# Patient Record
Sex: FEMALE
[De-identification: names, ages, dates, MRNs, and addresses within clinical notes are randomized per-mention and may not be internally consistent; named-entity substitution may affect disease eponyms.]

---

## 2020-02-09 ENCOUNTER — HOSPITAL ENCOUNTER (EMERGENCY)
Dept: HOSPITAL 94 - ER | Age: 55
Discharge: HOME | End: 2020-02-09
Payer: COMMERCIAL

## 2020-02-09 VITALS — SYSTOLIC BLOOD PRESSURE: 161 MMHG | DIASTOLIC BLOOD PRESSURE: 90 MMHG

## 2020-02-09 VITALS — BODY MASS INDEX: 29.51 KG/M2 | HEIGHT: 62 IN | WEIGHT: 160.34 LBS

## 2020-02-09 DIAGNOSIS — M25.531: Primary | ICD-10-CM

## 2020-02-09 DIAGNOSIS — Z79.899: ICD-10-CM

## 2020-02-09 DIAGNOSIS — M25.511: ICD-10-CM

## 2020-02-09 PROCEDURE — 29125 APPL SHORT ARM SPLINT STATIC: CPT

## 2020-02-09 PROCEDURE — 99284 EMERGENCY DEPT VISIT MOD MDM: CPT

## 2020-02-09 PROCEDURE — 73060 X-RAY EXAM OF HUMERUS: CPT

## 2022-01-08 ENCOUNTER — HOSPITAL ENCOUNTER (EMERGENCY)
Dept: HOSPITAL 94 - ER | Age: 57
Discharge: HOME | End: 2022-01-08
Payer: SELF-PAY

## 2022-01-08 VITALS — SYSTOLIC BLOOD PRESSURE: 141 MMHG | DIASTOLIC BLOOD PRESSURE: 72 MMHG

## 2022-01-08 VITALS — BODY MASS INDEX: 29.51 KG/M2 | HEIGHT: 62 IN | WEIGHT: 160.34 LBS

## 2022-01-08 DIAGNOSIS — F17.200: ICD-10-CM

## 2022-01-08 DIAGNOSIS — Z98.890: ICD-10-CM

## 2022-01-08 DIAGNOSIS — G89.29: ICD-10-CM

## 2022-01-08 DIAGNOSIS — M25.561: Primary | ICD-10-CM

## 2022-01-08 PROCEDURE — 99284 EMERGENCY DEPT VISIT MOD MDM: CPT

## 2022-01-08 PROCEDURE — 96372 THER/PROPH/DIAG INJ SC/IM: CPT

## 2022-01-08 PROCEDURE — 73560 X-RAY EXAM OF KNEE 1 OR 2: CPT

## 2022-01-08 NOTE — NUR
per dr hernandez, check in about 30 min if pt able to straighten leg further 
after pain medications have time to work. if pt able to straighten out more, 
place pt in knee immobilizer with crutches. if pt unable to straighten out for 
immobilizer place several ace wraps around knee and d/c with crutches.